# Patient Record
Sex: MALE | Race: WHITE | URBAN - METROPOLITAN AREA
[De-identification: names, ages, dates, MRNs, and addresses within clinical notes are randomized per-mention and may not be internally consistent; named-entity substitution may affect disease eponyms.]

---

## 2020-07-29 ENCOUNTER — OFFICE VISIT (OUTPATIENT)
Dept: URBAN - METROPOLITAN AREA CLINIC 82 | Facility: CLINIC | Age: 60
End: 2020-07-29
Payer: COMMERCIAL

## 2020-07-29 DIAGNOSIS — R14.0 BLOATING AND GAS: ICD-10-CM

## 2020-07-29 DIAGNOSIS — R19.7 DIARRHEA: ICD-10-CM

## 2020-07-29 PROCEDURE — G8417 CALC BMI ABV UP PARAM F/U: HCPCS | Performed by: INTERNAL MEDICINE

## 2020-07-29 PROCEDURE — G8427 DOCREV CUR MEDS BY ELIG CLIN: HCPCS | Performed by: INTERNAL MEDICINE

## 2020-07-29 PROCEDURE — G9903 PT SCRN TBCO ID AS NON USER: HCPCS | Performed by: INTERNAL MEDICINE

## 2020-07-29 PROCEDURE — 1036F TOBACCO NON-USER: CPT | Performed by: INTERNAL MEDICINE

## 2020-07-29 PROCEDURE — 99244 OFF/OP CNSLTJ NEW/EST MOD 40: CPT | Performed by: INTERNAL MEDICINE

## 2020-07-29 PROCEDURE — 99204 OFFICE O/P NEW MOD 45 MIN: CPT | Performed by: INTERNAL MEDICINE

## 2020-07-29 RX ORDER — CELECOXIB 200 MG/1
1 CAPSULE WITH FOOD CAPSULE ORAL ONCE A DAY
Status: ACTIVE | COMMUNITY

## 2020-07-29 RX ORDER — TAMSULOSIN HYDROCHLORIDE 0.4 MG/1
1 CAPSULE CAPSULE ORAL ONCE A DAY
Status: ACTIVE | COMMUNITY

## 2020-07-29 RX ORDER — SILDENAFIL 100 MG/1
1 TABLET AS NEEDED TABLET, FILM COATED ORAL ONCE A DAY
Status: ACTIVE | COMMUNITY

## 2020-07-29 RX ORDER — MULTIVITAMIN
AS DIRECTED CAPSULE ORAL
Status: ACTIVE | COMMUNITY

## 2020-07-29 RX ORDER — METFORMIN HYDROCHLORIDE 500 MG/1
1 TABLET WITH A MEAL TABLET, FILM COATED ORAL ONCE A DAY
Status: ACTIVE | COMMUNITY

## 2020-07-29 RX ORDER — MONTELUKAST SODIUM 10 MG/1
1 TABLET TABLET ORAL ONCE A DAY
Status: ACTIVE | COMMUNITY

## 2020-07-29 RX ORDER — ASCORBIC ACID 100 MG
1 TABLET TABLET,CHEWABLE ORAL ONCE A DAY
Status: ACTIVE | COMMUNITY

## 2020-07-29 RX ORDER — BISMUTH SUBSALICYLATE 525 MG/1
AS DIRECTED TABLET ORAL ONCE A DAY
Qty: 30 | Refills: 1 | OUTPATIENT
Start: 2020-07-29 | End: 2020-09-27

## 2020-07-29 RX ORDER — CHOLESTYRAMINE 4 G/9G
1 PACKET MIXED WITH WATER OR NON-CARBONATED DRINK POWDER, FOR SUSPENSION ORAL TWICE A DAY
Qty: 60 | Refills: 1 | OUTPATIENT
Start: 2020-07-29

## 2020-07-29 RX ORDER — POLYETHYLENE GLYCOL-3350 AND ELECTROLYTES WITH FLAVOR PACK 240; 5.84; 2.98; 6.72; 22.72 G/278.26G; G/278.26G; G/278.26G; G/278.26G; G/278.26G
AS DIRECTED POWDER, FOR SOLUTION ORAL
Qty: 1 | Refills: 0 | OUTPATIENT
Start: 2020-07-29 | End: 2020-07-30

## 2020-07-29 NOTE — HPI-TODAY'S VISIT:
07/29/2020 Patient is a 59 year old, White male, who presents with complaints of explosive, loose bowel movements. He has been on Metformin for about a year now. He had a colonoscopy in the past, many years ago. He reports abdominal pain, only when he is about to have a bowel movement. He denies any rectal bleeding. He does report constipation in the morning. Patient is also taking testosterone.

## 2020-08-28 ENCOUNTER — OFFICE VISIT (OUTPATIENT)
Dept: URBAN - METROPOLITAN AREA SURGERY CENTER 13 | Facility: SURGERY CENTER | Age: 60
End: 2020-08-28
Payer: COMMERCIAL

## 2020-08-28 ENCOUNTER — OFFICE VISIT (OUTPATIENT)
Dept: URBAN - METROPOLITAN AREA SURGERY CENTER 13 | Facility: SURGERY CENTER | Age: 60
End: 2020-08-28

## 2020-08-28 DIAGNOSIS — K64.1 GRADE II HEMORRHOIDS: ICD-10-CM

## 2020-08-28 DIAGNOSIS — K63.89 BACTERIAL OVERGROWTH SYNDROME: ICD-10-CM

## 2020-08-28 DIAGNOSIS — K52.9 ACUTE COLITIS: ICD-10-CM

## 2020-08-28 PROCEDURE — 46221 LIGATION OF HEMORRHOID(S): CPT | Performed by: INTERNAL MEDICINE

## 2020-08-28 PROCEDURE — 45380 COLONOSCOPY AND BIOPSY: CPT | Performed by: INTERNAL MEDICINE

## 2020-08-28 PROCEDURE — G8907 PT DOC NO EVENTS ON DISCHARG: HCPCS | Performed by: INTERNAL MEDICINE

## 2020-08-28 PROCEDURE — G9937 DIG OR SURV COLSCO: HCPCS | Performed by: INTERNAL MEDICINE

## 2022-09-28 ENCOUNTER — WEB ENCOUNTER (OUTPATIENT)
Dept: URBAN - METROPOLITAN AREA CLINIC 82 | Facility: CLINIC | Age: 62
End: 2022-09-28

## 2022-09-28 ENCOUNTER — OFFICE VISIT (OUTPATIENT)
Dept: URBAN - METROPOLITAN AREA CLINIC 82 | Facility: CLINIC | Age: 62
End: 2022-09-28
Payer: COMMERCIAL

## 2022-09-28 VITALS
RESPIRATION RATE: 16 BRPM | WEIGHT: 245 LBS | TEMPERATURE: 97.7 F | HEIGHT: 71 IN | DIASTOLIC BLOOD PRESSURE: 76 MMHG | SYSTOLIC BLOOD PRESSURE: 117 MMHG | BODY MASS INDEX: 34.3 KG/M2 | HEART RATE: 102 BPM

## 2022-09-28 DIAGNOSIS — R14.0 BLOATING AND GAS: ICD-10-CM

## 2022-09-28 DIAGNOSIS — R19.7 DIARRHEA: ICD-10-CM

## 2022-09-28 DIAGNOSIS — K58.0 IRRITABLE BOWEL SYNDROME WITH DIARRHEA: ICD-10-CM

## 2022-09-28 PROCEDURE — G8417 CALC BMI ABV UP PARAM F/U: HCPCS | Performed by: INTERNAL MEDICINE

## 2022-09-28 PROCEDURE — G8427 DOCREV CUR MEDS BY ELIG CLIN: HCPCS | Performed by: INTERNAL MEDICINE

## 2022-09-28 PROCEDURE — 99214 OFFICE O/P EST MOD 30 MIN: CPT | Performed by: INTERNAL MEDICINE

## 2022-09-28 PROCEDURE — G9903 PT SCRN TBCO ID AS NON USER: HCPCS | Performed by: INTERNAL MEDICINE

## 2022-09-28 RX ORDER — TAMSULOSIN HYDROCHLORIDE 0.4 MG/1
1 CAPSULE CAPSULE ORAL ONCE A DAY
Status: ACTIVE | COMMUNITY

## 2022-09-28 RX ORDER — METFORMIN HYDROCHLORIDE 500 MG/1
1 TABLET WITH A MEAL TABLET, FILM COATED ORAL ONCE A DAY
Status: ACTIVE | COMMUNITY

## 2022-09-28 RX ORDER — CHOLESTYRAMINE 4 G/9G
1 PACKET MIXED WITH WATER OR NON-CARBONATED DRINK POWDER, FOR SUSPENSION ORAL TWICE A DAY
Qty: 60 | Refills: 1 | OUTPATIENT
Start: 2022-09-28

## 2022-09-28 RX ORDER — ASCORBIC ACID 100 MG
1 TABLET TABLET,CHEWABLE ORAL ONCE A DAY
Status: ACTIVE | COMMUNITY

## 2022-09-28 RX ORDER — CELECOXIB 200 MG/1
1 CAPSULE WITH FOOD CAPSULE ORAL ONCE A DAY
Status: ACTIVE | COMMUNITY

## 2022-09-28 RX ORDER — MULTIVITAMIN
AS DIRECTED CAPSULE ORAL
Status: ACTIVE | COMMUNITY

## 2022-09-28 RX ORDER — HYOSCYAMINE SULFATE 0.12 MG/1
1 TABLET AS NEEDED TABLET ORAL THREE TIMES A DAY
Qty: 270 TABLET | Refills: 1 | OUTPATIENT
Start: 2022-09-28 | End: 2023-03-27

## 2022-09-28 RX ORDER — MONTELUKAST SODIUM 10 MG/1
1 TABLET TABLET ORAL ONCE A DAY
Status: ACTIVE | COMMUNITY

## 2022-09-28 RX ORDER — CHOLESTYRAMINE 4 G/9G
1 PACKET MIXED WITH WATER OR NON-CARBONATED DRINK POWDER, FOR SUSPENSION ORAL TWICE A DAY
Qty: 60 | Refills: 1 | Status: ACTIVE | COMMUNITY
Start: 2020-07-29

## 2022-09-28 RX ORDER — SILDENAFIL 100 MG/1
1 TABLET AS NEEDED TABLET, FILM COATED ORAL ONCE A DAY
Status: ACTIVE | COMMUNITY

## 2022-09-28 NOTE — HPI-TODAY'S VISIT:
07/29/2020 Patient is a 59 year old, White male, who presents with complaints of explosive, loose bowel movements. He has been on Metformin for about a year now. He had a colonoscopy in the past, many years ago. He reports abdominal pain, only when he is about to have a bowel movement. He denies any rectal bleeding. He does report constipation in the morning. Patient is also taking testosterone.  9/28/2022 Patient presents for GI issues. Patient states that he has constant diarrhea. Patient states that the diarrhea has been the same since the last visit. He states that he has about 3-4 bowels movements a day with diarrhea. Patient states that he believes that he was given questran in 2020 but that he does not remember if it was helpful. Patient takes metformin for diabetes. He takes testosterone as well. Patient states that he thinks that anything causes him the diarrhea. He denies being more stressed lately and getting upset easily. Patient denies family history of colon caner or polyps. He states that he takes ibuprofen occasionally. Patient still has his gall bladder.

## 2022-09-30 ENCOUNTER — TELEPHONE ENCOUNTER (OUTPATIENT)
Dept: URBAN - METROPOLITAN AREA CLINIC 82 | Facility: CLINIC | Age: 62
End: 2022-09-30

## 2022-10-24 ENCOUNTER — WEB ENCOUNTER (OUTPATIENT)
Dept: URBAN - METROPOLITAN AREA CLINIC 82 | Facility: CLINIC | Age: 62
End: 2022-10-24

## 2022-10-26 ENCOUNTER — OFFICE VISIT (OUTPATIENT)
Dept: URBAN - METROPOLITAN AREA CLINIC 82 | Facility: CLINIC | Age: 62
End: 2022-10-26
Payer: COMMERCIAL

## 2022-10-26 VITALS
HEIGHT: 71 IN | DIASTOLIC BLOOD PRESSURE: 78 MMHG | HEART RATE: 83 BPM | TEMPERATURE: 97.7 F | WEIGHT: 236.2 LBS | SYSTOLIC BLOOD PRESSURE: 132 MMHG | BODY MASS INDEX: 33.07 KG/M2 | RESPIRATION RATE: 16 BRPM

## 2022-10-26 DIAGNOSIS — R19.7 DIARRHEA: ICD-10-CM

## 2022-10-26 DIAGNOSIS — R14.0 BLOATING AND GAS: ICD-10-CM

## 2022-10-26 DIAGNOSIS — K58.0 IRRITABLE BOWEL SYNDROME WITH DIARRHEA: ICD-10-CM

## 2022-10-26 PROCEDURE — G8417 CALC BMI ABV UP PARAM F/U: HCPCS | Performed by: INTERNAL MEDICINE

## 2022-10-26 PROCEDURE — G9903 PT SCRN TBCO ID AS NON USER: HCPCS | Performed by: INTERNAL MEDICINE

## 2022-10-26 PROCEDURE — 99214 OFFICE O/P EST MOD 30 MIN: CPT | Performed by: INTERNAL MEDICINE

## 2022-10-26 PROCEDURE — G8427 DOCREV CUR MEDS BY ELIG CLIN: HCPCS | Performed by: INTERNAL MEDICINE

## 2022-10-26 RX ORDER — METFORMIN HYDROCHLORIDE 500 MG/1
1 TABLET WITH A MEAL TABLET, FILM COATED ORAL ONCE A DAY
Status: ACTIVE | COMMUNITY

## 2022-10-26 RX ORDER — MULTIVITAMIN
AS DIRECTED CAPSULE ORAL
Status: ACTIVE | COMMUNITY

## 2022-10-26 RX ORDER — HYOSCYAMINE SULFATE 0.12 MG/1
1 TABLET AS NEEDED TABLET ORAL THREE TIMES A DAY
Qty: 270 TABLET | Refills: 1 | Status: ACTIVE | COMMUNITY
Start: 2022-09-28 | End: 2023-03-27

## 2022-10-26 RX ORDER — CELECOXIB 200 MG/1
1 CAPSULE WITH FOOD CAPSULE ORAL ONCE A DAY
Status: ACTIVE | COMMUNITY

## 2022-10-26 RX ORDER — TAMSULOSIN HYDROCHLORIDE 0.4 MG/1
1 CAPSULE CAPSULE ORAL ONCE A DAY
Status: ACTIVE | COMMUNITY

## 2022-10-26 RX ORDER — ASCORBIC ACID 100 MG
1 TABLET TABLET,CHEWABLE ORAL ONCE A DAY
Status: ACTIVE | COMMUNITY

## 2022-10-26 RX ORDER — CHOLESTYRAMINE 4 G/9G
1 PACKET MIXED WITH WATER OR NON-CARBONATED DRINK POWDER, FOR SUSPENSION ORAL TWICE A DAY
Qty: 60 | Refills: 1 | Status: ACTIVE | COMMUNITY
Start: 2020-07-29

## 2022-10-26 RX ORDER — MONTELUKAST SODIUM 10 MG/1
1 TABLET TABLET ORAL ONCE A DAY
Status: ACTIVE | COMMUNITY

## 2022-10-26 RX ORDER — CHOLESTYRAMINE 4 G/9G
1 PACKET MIXED WITH WATER OR NON-CARBONATED DRINK POWDER, FOR SUSPENSION ORAL TWICE A DAY
Qty: 60 | Refills: 1 | Status: ACTIVE | COMMUNITY
Start: 2022-09-28

## 2022-10-26 RX ORDER — SILDENAFIL 100 MG/1
1 TABLET AS NEEDED TABLET, FILM COATED ORAL ONCE A DAY
Status: ACTIVE | COMMUNITY

## 2022-10-26 NOTE — HPI-TODAY'S VISIT:
07/29/2020 Patient is a 59 year old, White male, who presents with complaints of explosive, loose bowel movements. He has been on Metformin for about a year now. He had a colonoscopy in the past, many years ago. He reports abdominal pain, only when he is about to have a bowel movement. He denies any rectal bleeding. He does report constipation in the morning. Patient is also taking testosterone.  9/28/2022 Patient presents for GI issues. Patient states that he has constant diarrhea. Patient states that the diarrhea has been the same since the last visit. He states that he has about 3-4 bowels movements a day with diarrhea. Patient states that he believes that he was given questran in 2020 but that he does not remember if it was helpful. Patient takes metformin for diabetes. He takes testosterone as well. Patient states that he thinks that anything causes him the diarrhea. He denies being more stressed lately and getting upset easily. Patient denies family history of colon caner or polyps. He states that he takes ibuprofen occasionally. Patient still has his gall bladder.  10/26/2022 Patient presents for a follow up on medications. Patient denies any more loose bowels. He states that he is still on hyoscyamine and questran powder for his diarrhea. He does state that he is getting a bit constipated now and wants to cut back on the questran powder. Patient also states that he has gas. He states that overall, things have been a lot better since the last visit. Patient states that he stopped the metformin.

## 2023-01-18 ENCOUNTER — OFFICE VISIT (OUTPATIENT)
Dept: URBAN - METROPOLITAN AREA CLINIC 82 | Facility: CLINIC | Age: 63
End: 2023-01-18
Payer: COMMERCIAL

## 2023-01-18 VITALS
WEIGHT: 236 LBS | HEIGHT: 71 IN | HEART RATE: 75 BPM | SYSTOLIC BLOOD PRESSURE: 135 MMHG | TEMPERATURE: 98.4 F | DIASTOLIC BLOOD PRESSURE: 81 MMHG | BODY MASS INDEX: 33.04 KG/M2

## 2023-01-18 DIAGNOSIS — R14.0 BLOATING AND GAS: ICD-10-CM

## 2023-01-18 DIAGNOSIS — K58.0 IRRITABLE BOWEL SYNDROME WITH DIARRHEA: ICD-10-CM

## 2023-01-18 DIAGNOSIS — R19.7 DIARRHEA: ICD-10-CM

## 2023-01-18 PROBLEM — 271832001 FLATULENCE, ERUCTATION AND GAS PAIN: Status: ACTIVE | Noted: 2020-07-29

## 2023-01-18 PROCEDURE — G8417 CALC BMI ABV UP PARAM F/U: HCPCS | Performed by: INTERNAL MEDICINE

## 2023-01-18 PROCEDURE — 99214 OFFICE O/P EST MOD 30 MIN: CPT | Performed by: INTERNAL MEDICINE

## 2023-01-18 PROCEDURE — G8427 DOCREV CUR MEDS BY ELIG CLIN: HCPCS | Performed by: INTERNAL MEDICINE

## 2023-01-18 PROCEDURE — G9903 PT SCRN TBCO ID AS NON USER: HCPCS | Performed by: INTERNAL MEDICINE

## 2023-01-18 RX ORDER — CHOLESTYRAMINE 4 G/9G
1 PACKET MIXED WITH WATER OR NON-CARBONATED DRINK POWDER, FOR SUSPENSION ORAL TWICE A DAY
Qty: 60 | Refills: 1 | Status: ACTIVE | COMMUNITY
Start: 2020-07-29

## 2023-01-18 RX ORDER — TAMSULOSIN HYDROCHLORIDE 0.4 MG/1
1 CAPSULE CAPSULE ORAL ONCE A DAY
Status: ACTIVE | COMMUNITY

## 2023-01-18 RX ORDER — ASCORBIC ACID 100 MG
1 TABLET TABLET,CHEWABLE ORAL ONCE A DAY
Status: ACTIVE | COMMUNITY

## 2023-01-18 RX ORDER — HYOSCYAMINE SULFATE 0.12 MG/1
1 TABLET AS NEEDED TABLET ORAL THREE TIMES A DAY
Qty: 270 TABLET | Refills: 1 | OUTPATIENT
Start: 2023-01-18 | End: 2023-07-17

## 2023-01-18 RX ORDER — CHOLESTYRAMINE 4 G/9G
1 PACKET MIXED WITH WATER OR NON-CARBONATED DRINK POWDER, FOR SUSPENSION ORAL ONCE A DAY
Qty: 90 | Refills: 1 | OUTPATIENT
Start: 2023-01-18

## 2023-01-18 RX ORDER — MONTELUKAST SODIUM 10 MG/1
1 TABLET TABLET ORAL ONCE A DAY
Status: ACTIVE | COMMUNITY

## 2023-01-18 RX ORDER — SILDENAFIL 100 MG/1
1 TABLET AS NEEDED TABLET, FILM COATED ORAL ONCE A DAY
Status: ACTIVE | COMMUNITY

## 2023-01-18 RX ORDER — MULTIVITAMIN
AS DIRECTED CAPSULE ORAL
Status: ACTIVE | COMMUNITY

## 2023-01-18 RX ORDER — CHOLESTYRAMINE 4 G/9G
1 PACKET MIXED WITH WATER OR NON-CARBONATED DRINK POWDER, FOR SUSPENSION ORAL TWICE A DAY
Qty: 60 | Refills: 1 | Status: ACTIVE | COMMUNITY
Start: 2022-09-28

## 2023-01-18 RX ORDER — HYOSCYAMINE SULFATE 0.12 MG/1
1 TABLET AS NEEDED TABLET ORAL THREE TIMES A DAY
Qty: 270 TABLET | Refills: 1 | Status: ACTIVE | COMMUNITY
Start: 2022-09-28 | End: 2023-03-27

## 2023-01-18 RX ORDER — CELECOXIB 200 MG/1
1 CAPSULE WITH FOOD CAPSULE ORAL ONCE A DAY
Status: ACTIVE | COMMUNITY

## 2023-01-18 RX ORDER — METFORMIN HYDROCHLORIDE 500 MG/1
1 TABLET WITH A MEAL TABLET, FILM COATED ORAL ONCE A DAY
Status: ACTIVE | COMMUNITY

## 2023-01-18 NOTE — HPI-TODAY'S VISIT:
07/29/2020 Patient is a 59 year old, White male, who presents with complaints of explosive, loose bowel movements. He has been on Metformin for about a year now. He had a colonoscopy in the past, many years ago. He reports abdominal pain, only when he is about to have a bowel movement. He denies any rectal bleeding. He does report constipation in the morning. Patient is also taking testosterone.  9/28/2022 Patient presents for GI issues. Patient states that he has constant diarrhea. Patient states that the diarrhea has been the same since the last visit. He states that he has about 3-4 bowels movements a day with diarrhea. Patient states that he believes that he was given questran in 2020 but that he does not remember if it was helpful. Patient takes metformin for diabetes. He takes testosterone as well. Patient states that he thinks that anything causes him the diarrhea. He denies being more stressed lately and getting upset easily. Patient denies family history of colon caner or polyps. He states that he takes ibuprofen occasionally. Patient still has his gall bladder.  10/26/2022 Patient presents for a follow up on medications. Patient denies any more loose bowels. He states that he is still on hyoscyamine and questran powder for his diarrhea. He does state that he is getting a bit constipated now and wants to cut back on the questran powder. Patient also states that he has gas. He states that overall, things have been a lot better since the last visit. Patient states that he stopped the metformin.  1/18/2023 Patient presents for a follow up. Patient states that he is having has and that he will have loose bowels if he does not take the questran powder. He is currently taking one pack of powder a day. He states that he is having some constipation. Patient states that he has bloating sometimes and states that he seems to get gas with any foods. Patient states that he is on IBgard and that he has not seen much difference. Patient states that overall he is feeling a lot better. He states that he is on hyoscyamine twice a day. He denies heartburn and acid reflux.

## 2023-04-19 ENCOUNTER — OFFICE VISIT (OUTPATIENT)
Dept: URBAN - METROPOLITAN AREA CLINIC 82 | Facility: CLINIC | Age: 63
End: 2023-04-19

## 2023-06-02 ENCOUNTER — OFFICE VISIT (OUTPATIENT)
Dept: URBAN - METROPOLITAN AREA CLINIC 82 | Facility: CLINIC | Age: 63
End: 2023-06-02

## 2023-06-02 ENCOUNTER — TELEPHONE ENCOUNTER (OUTPATIENT)
Dept: URBAN - METROPOLITAN AREA CLINIC 82 | Facility: CLINIC | Age: 63
End: 2023-06-02

## 2023-06-02 RX ORDER — CHOLESTYRAMINE 4 G/9G
1 PACKET MIXED WITH WATER OR NON-CARBONATED DRINK POWDER, FOR SUSPENSION ORAL TWICE A DAY
Qty: 60 | Refills: 1 | Status: ACTIVE | COMMUNITY
Start: 2020-07-29

## 2023-06-02 RX ORDER — HYOSCYAMINE SULFATE 0.12 MG/1
1 TABLET AS NEEDED TABLET ORAL THREE TIMES A DAY
Qty: 270 TABLET | Refills: 1 | Status: ACTIVE | COMMUNITY
Start: 2023-01-18 | End: 2023-07-17

## 2023-06-02 RX ORDER — CELECOXIB 200 MG/1
1 CAPSULE WITH FOOD CAPSULE ORAL ONCE A DAY
Status: ACTIVE | COMMUNITY

## 2023-06-02 RX ORDER — SILDENAFIL 100 MG/1
1 TABLET AS NEEDED TABLET, FILM COATED ORAL ONCE A DAY
Status: ACTIVE | COMMUNITY

## 2023-06-02 RX ORDER — MONTELUKAST SODIUM 10 MG/1
1 TABLET TABLET ORAL ONCE A DAY
Status: ACTIVE | COMMUNITY

## 2023-06-02 RX ORDER — CHOLESTYRAMINE 4 G/9G
1 PACKET MIXED WITH WATER OR NON-CARBONATED DRINK POWDER, FOR SUSPENSION ORAL TWICE A DAY
Qty: 180 | Refills: 1 | OUTPATIENT
Start: 2023-06-02

## 2023-06-02 RX ORDER — ASCORBIC ACID 100 MG
1 TABLET TABLET,CHEWABLE ORAL ONCE A DAY
Status: ACTIVE | COMMUNITY

## 2023-06-02 RX ORDER — MULTIVITAMIN
AS DIRECTED CAPSULE ORAL
Status: ACTIVE | COMMUNITY

## 2023-06-02 RX ORDER — CHOLESTYRAMINE 4 G/9G
1 PACKET MIXED WITH WATER OR NON-CARBONATED DRINK POWDER, FOR SUSPENSION ORAL TWICE A DAY
Qty: 60 | Refills: 1 | Status: ACTIVE | COMMUNITY
Start: 2022-09-28

## 2023-06-02 RX ORDER — METFORMIN HYDROCHLORIDE 500 MG/1
1 TABLET WITH A MEAL TABLET, FILM COATED ORAL ONCE A DAY
Status: ACTIVE | COMMUNITY

## 2023-06-02 RX ORDER — TAMSULOSIN HYDROCHLORIDE 0.4 MG/1
1 CAPSULE CAPSULE ORAL ONCE A DAY
Status: ACTIVE | COMMUNITY

## 2023-06-02 RX ORDER — HYOSCYAMINE SULFATE 0.12 MG/1
1 TABLET AS NEEDED TABLET ORAL THREE TIMES A DAY
Qty: 270 TABLET | Refills: 1 | OUTPATIENT
Start: 2023-06-02 | End: 2023-11-28

## 2023-06-02 RX ORDER — CHOLESTYRAMINE 4 G/9G
1 PACKET MIXED WITH WATER OR NON-CARBONATED DRINK POWDER, FOR SUSPENSION ORAL ONCE A DAY
Qty: 90 | Refills: 1 | Status: ACTIVE | COMMUNITY
Start: 2023-01-18

## 2023-06-14 ENCOUNTER — TELEPHONE ENCOUNTER (OUTPATIENT)
Dept: URBAN - NONMETROPOLITAN AREA CLINIC 2 | Facility: CLINIC | Age: 63
End: 2023-06-14

## 2023-06-14 RX ORDER — HYOSCYAMINE SULFATE 0.12 MG/1
1 TABLET AS NEEDED TABLET ORAL THREE TIMES A DAY
Qty: 270 TABLET | Refills: 1
Start: 2023-06-02 | End: 2023-12-11

## 2023-06-23 ENCOUNTER — TELEPHONE ENCOUNTER (OUTPATIENT)
Dept: URBAN - METROPOLITAN AREA CLINIC 82 | Facility: CLINIC | Age: 63
End: 2023-06-23

## 2023-06-23 RX ORDER — CHOLESTYRAMINE 4 G/9G
1 PACKET MIXED WITH WATER OR NON-CARBONATED DRINK POWDER, FOR SUSPENSION ORAL TWICE A DAY
Qty: 60 | Refills: 1
Start: 2020-07-29

## 2023-07-12 ENCOUNTER — LAB OUTSIDE AN ENCOUNTER (OUTPATIENT)
Dept: URBAN - METROPOLITAN AREA CLINIC 82 | Facility: CLINIC | Age: 63
End: 2023-07-12

## 2023-07-12 ENCOUNTER — OFFICE VISIT (OUTPATIENT)
Dept: URBAN - METROPOLITAN AREA CLINIC 82 | Facility: CLINIC | Age: 63
End: 2023-07-12
Payer: COMMERCIAL

## 2023-07-12 VITALS
WEIGHT: 250 LBS | TEMPERATURE: 98.4 F | HEIGHT: 71 IN | HEART RATE: 96 BPM | BODY MASS INDEX: 35 KG/M2 | SYSTOLIC BLOOD PRESSURE: 125 MMHG | DIASTOLIC BLOOD PRESSURE: 80 MMHG

## 2023-07-12 DIAGNOSIS — R14.0 BLOATING: ICD-10-CM

## 2023-07-12 DIAGNOSIS — K58.0 IRRITABLE BOWEL SYNDROME WITH DIARRHEA: ICD-10-CM

## 2023-07-12 DIAGNOSIS — R19.7 DIARRHEA: ICD-10-CM

## 2023-07-12 PROCEDURE — 99214 OFFICE O/P EST MOD 30 MIN: CPT | Performed by: INTERNAL MEDICINE

## 2023-07-12 RX ORDER — CELECOXIB 200 MG/1
1 CAPSULE WITH FOOD CAPSULE ORAL ONCE A DAY
Status: ACTIVE | COMMUNITY

## 2023-07-12 RX ORDER — POLYETHYLENE GLYCOL-3350 AND ELECTROLYTES WITH FLAVOR PACK 240; 5.84; 2.98; 6.72; 22.72 G/278.26G; G/278.26G; G/278.26G; G/278.26G; G/278.26G
AS DIRECTED POWDER, FOR SOLUTION ORAL
Qty: 1 | Refills: 0 | OUTPATIENT
Start: 2023-07-12 | End: 2023-07-13

## 2023-07-12 RX ORDER — MONTELUKAST SODIUM 10 MG/1
1 TABLET TABLET ORAL ONCE A DAY
Status: ACTIVE | COMMUNITY

## 2023-07-12 RX ORDER — HYOSCYAMINE SULFATE 0.12 MG/1
1 TABLET AS NEEDED TABLET ORAL THREE TIMES A DAY
Qty: 270 TABLET | Refills: 1 | Status: ACTIVE | COMMUNITY
Start: 2023-06-02 | End: 2023-12-11

## 2023-07-12 RX ORDER — CHOLESTYRAMINE 4 G/9G
1 PACKET MIXED WITH WATER OR NON-CARBONATED DRINK POWDER, FOR SUSPENSION ORAL ONCE A DAY
Qty: 90 | Refills: 1 | Status: ACTIVE | COMMUNITY
Start: 2023-01-18

## 2023-07-12 RX ORDER — TAMSULOSIN HYDROCHLORIDE 0.4 MG/1
1 CAPSULE CAPSULE ORAL ONCE A DAY
Status: ACTIVE | COMMUNITY

## 2023-07-12 RX ORDER — CHOLESTYRAMINE 4 G/9G
1 PACKET MIXED WITH WATER OR NON-CARBONATED DRINK POWDER, FOR SUSPENSION ORAL TWICE A DAY
Qty: 60 | Refills: 1 | Status: ACTIVE | COMMUNITY
Start: 2020-07-29

## 2023-07-12 RX ORDER — METFORMIN HYDROCHLORIDE 500 MG/1
1 TABLET WITH A MEAL TABLET, FILM COATED ORAL ONCE A DAY
Status: ACTIVE | COMMUNITY

## 2023-07-12 RX ORDER — SILDENAFIL 100 MG/1
1 TABLET AS NEEDED TABLET, FILM COATED ORAL ONCE A DAY
Status: ACTIVE | COMMUNITY

## 2023-07-12 RX ORDER — HYOSCYAMINE SULFATE 0.12 MG/1
1 TABLET AS NEEDED TABLET ORAL THREE TIMES A DAY
Qty: 270 TABLET | Refills: 1 | Status: ACTIVE | COMMUNITY
Start: 2023-01-18 | End: 2023-07-17

## 2023-07-12 RX ORDER — ASCORBIC ACID 100 MG
1 TABLET TABLET,CHEWABLE ORAL ONCE A DAY
Status: ACTIVE | COMMUNITY

## 2023-07-12 RX ORDER — CHOLESTYRAMINE 4 G/9G
1 PACKET MIXED WITH WATER OR NON-CARBONATED DRINK POWDER, FOR SUSPENSION ORAL TWICE A DAY
Qty: 60 | Refills: 1 | Status: ACTIVE | COMMUNITY
Start: 2022-09-28

## 2023-07-12 RX ORDER — MULTIVITAMIN
AS DIRECTED CAPSULE ORAL
Status: ACTIVE | COMMUNITY

## 2023-07-12 RX ORDER — CHOLESTYRAMINE 4 G/9G
1 PACKET MIXED WITH WATER OR NON-CARBONATED DRINK POWDER, FOR SUSPENSION ORAL TWICE A DAY
Qty: 180 | Refills: 1 | Status: ACTIVE | COMMUNITY
Start: 2023-06-02

## 2023-07-12 RX ORDER — CHOLESTYRAMINE 4 G/9G
1 PACKET MIXED WITH WATER OR NON-CARBONATED DRINK POWDER, FOR SUSPENSION ORAL TWICE A DAY
Qty: 180 | Refills: 1 | OUTPATIENT
Start: 2023-07-12

## 2023-07-12 NOTE — HPI-TODAY'S VISIT:
07/29/2020 Patient is a 59 year old, White male, who presents with complaints of explosive, loose bowel movements. He has been on Metformin for about a year now. He had a colonoscopy in the past, many years ago. He reports abdominal pain, only when he is about to have a bowel movement. He denies any rectal bleeding. He does report constipation in the morning. Patient is also taking testosterone.  9/28/2022 Patient presents for GI issues. Patient states that he has constant diarrhea. Patient states that the diarrhea has been the same since the last visit. He states that he has about 3-4 bowels movements a day with diarrhea. Patient states that he believes that he was given questran in 2020 but that he does not remember if it was helpful. Patient takes metformin for diabetes. He takes testosterone as well. Patient states that he thinks that anything causes him the diarrhea. He denies being more stressed lately and getting upset easily. Patient denies family history of colon caner or polyps. He states that he takes ibuprofen occasionally. Patient still has his gall bladder.  10/26/2022 Patient presents for a follow up on medications. Patient denies any more loose bowels. He states that he is still on hyoscyamine and questran powder for his diarrhea. He does state that he is getting a bit constipated now and wants to cut back on the questran powder. Patient also states that he has gas. He states that overall, things have been a lot better since the last visit. Patient states that he stopped the metformin.  1/18/2023 Patient presents for a follow up. Patient states that he is having has and that he will have loose bowels if he does not take the questran powder. He is currently taking one pack of powder a day. He states that he is having some constipation. Patient states that he has bloating sometimes and states that he seems to get gas with any foods. Patient states that he is on IBgard and that he has not seen much difference. Patient states that overall he is feeling a lot better. He states that he is on hyoscyamine twice a day. He denies heartburn and acid reflux.  7/12/2023 Patient is here for a follow up visit. Patient states that he continues to experience diarrhea. He states that his wife has come back home which has improved his diet quality. Patient states that his diet quality may decrease once she leaves soon. Patient states that he is currently taking IB Guard. Patient has also been taking cholestyramine and hyoscyamine.

## 2023-09-13 ENCOUNTER — OFFICE VISIT (OUTPATIENT)
Dept: URBAN - METROPOLITAN AREA CLINIC 82 | Facility: CLINIC | Age: 63
End: 2023-09-13
Payer: COMMERCIAL

## 2023-09-13 VITALS
BODY MASS INDEX: 35.42 KG/M2 | HEART RATE: 90 BPM | HEIGHT: 71 IN | WEIGHT: 253 LBS | TEMPERATURE: 98.1 F | SYSTOLIC BLOOD PRESSURE: 135 MMHG | DIASTOLIC BLOOD PRESSURE: 80 MMHG

## 2023-09-13 DIAGNOSIS — K58.0 IRRITABLE BOWEL SYNDROME WITH DIARRHEA: ICD-10-CM

## 2023-09-13 DIAGNOSIS — R19.7 DIARRHEA: ICD-10-CM

## 2023-09-13 DIAGNOSIS — R14.0 BLOATING: ICD-10-CM

## 2023-09-13 PROCEDURE — 99214 OFFICE O/P EST MOD 30 MIN: CPT | Performed by: INTERNAL MEDICINE

## 2023-09-13 RX ORDER — METFORMIN HYDROCHLORIDE 500 MG/1
1 TABLET WITH A MEAL TABLET, FILM COATED ORAL ONCE A DAY
Status: ACTIVE | COMMUNITY

## 2023-09-13 RX ORDER — CHOLESTYRAMINE 4 G/9G
1 PACKET MIXED WITH WATER OR NON-CARBONATED DRINK POWDER, FOR SUSPENSION ORAL TWICE A DAY
Qty: 180 | Refills: 1 | OUTPATIENT
Start: 2023-09-13

## 2023-09-13 RX ORDER — HYOSCYAMINE SULFATE 0.12 MG/1
1 TABLET AS NEEDED TABLET ORAL THREE TIMES A DAY
Qty: 270 TABLET | Refills: 1 | Status: ACTIVE | COMMUNITY
Start: 2023-06-02 | End: 2023-12-11

## 2023-09-13 RX ORDER — DICYCLOMINE HYDROCHLORIDE 10 MG/1
1 TABLET CAPSULE ORAL THREE TIMES A DAY
Qty: 270 TABLET | Refills: 1 | OUTPATIENT
Start: 2023-09-13 | End: 2024-03-11

## 2023-09-13 RX ORDER — MULTIVITAMIN
AS DIRECTED CAPSULE ORAL
Status: ACTIVE | COMMUNITY

## 2023-09-13 RX ORDER — CELECOXIB 200 MG/1
1 CAPSULE WITH FOOD CAPSULE ORAL ONCE A DAY
Status: ACTIVE | COMMUNITY

## 2023-09-13 RX ORDER — ASCORBIC ACID 100 MG
1 TABLET TABLET,CHEWABLE ORAL ONCE A DAY
Status: ACTIVE | COMMUNITY

## 2023-09-13 RX ORDER — TAMSULOSIN HYDROCHLORIDE 0.4 MG/1
1 CAPSULE CAPSULE ORAL ONCE A DAY
Status: ACTIVE | COMMUNITY

## 2023-09-13 RX ORDER — CHOLESTYRAMINE 4 G/9G
1 PACKET MIXED WITH WATER OR NON-CARBONATED DRINK POWDER, FOR SUSPENSION ORAL TWICE A DAY
Qty: 180 | Refills: 1 | Status: ACTIVE | COMMUNITY
Start: 2023-06-02

## 2023-09-13 RX ORDER — MONTELUKAST SODIUM 10 MG/1
1 TABLET TABLET ORAL ONCE A DAY
Status: ACTIVE | COMMUNITY

## 2023-09-13 RX ORDER — SILDENAFIL 100 MG/1
1 TABLET AS NEEDED TABLET, FILM COATED ORAL ONCE A DAY
Status: ACTIVE | COMMUNITY

## 2023-09-13 NOTE — HPI-TODAY'S VISIT:
07/29/2020 Patient is a 59 year old, White male, who presents with complaints of explosive, loose bowel movements. He has been on Metformin for about a year now. He had a colonoscopy in the past, many years ago. He reports abdominal pain, only when he is about to have a bowel movement. He denies any rectal bleeding. He does report constipation in the morning. Patient is also taking testosterone.  9/28/2022 Patient presents for GI issues. Patient states that he has constant diarrhea. Patient states that the diarrhea has been the same since the last visit. He states that he has about 3-4 bowels movements a day with diarrhea. Patient states that he believes that he was given questran in 2020 but that he does not remember if it was helpful. Patient takes metformin for diabetes. He takes testosterone as well. Patient states that he thinks that anything causes him the diarrhea. He denies being more stressed lately and getting upset easily. Patient denies family history of colon caner or polyps. He states that he takes ibuprofen occasionally. Patient still has his gall bladder.  10/26/2022 Patient presents for a follow up on medications. Patient denies any more loose bowels. He states that he is still on hyoscyamine and questran powder for his diarrhea. He does state that he is getting a bit constipated now and wants to cut back on the questran powder. Patient also states that he has gas. He states that overall, things have been a lot better since the last visit. Patient states that he stopped the metformin.  1/18/2023 Patient presents for a follow up. Patient states that he is having has and that he will have loose bowels if he does not take the questran powder. He is currently taking one pack of powder a day. He states that he is having some constipation. Patient states that he has bloating sometimes and states that he seems to get gas with any foods. Patient states that he is on IBgard and that he has not seen much difference. Patient states that overall he is feeling a lot better. He states that he is on hyoscyamine twice a day. He denies heartburn and acid reflux.  7/12/2023 Patient is here for a follow up visit. Patient states that he continues to experience diarrhea. He states that his wife has come back home which has improved his diet quality. Patient states that his diet quality may decrease once she leaves soon. Patient states that he is currently taking IBgard. Patient has also been taking cholestyramine and hyoscyamine.  9/13/2023 Patient presents for a follow up. Patient denies having a lot of loose bowels and states that he only has a couple of loose bowels. Patient states that he has noticed certain foods that trigger his symptoms. He is currently on dicyclomine and questran powder. Patient is also still on IBgard.

## 2024-01-10 ENCOUNTER — OFFICE VISIT (OUTPATIENT)
Dept: URBAN - METROPOLITAN AREA CLINIC 82 | Facility: CLINIC | Age: 64
End: 2024-01-10
Payer: COMMERCIAL

## 2024-01-10 VITALS
HEIGHT: 71 IN | SYSTOLIC BLOOD PRESSURE: 131 MMHG | WEIGHT: 251 LBS | HEART RATE: 71 BPM | BODY MASS INDEX: 35.14 KG/M2 | DIASTOLIC BLOOD PRESSURE: 83 MMHG | TEMPERATURE: 98.4 F

## 2024-01-10 DIAGNOSIS — K58.0 IRRITABLE BOWEL SYNDROME WITH DIARRHEA: ICD-10-CM

## 2024-01-10 DIAGNOSIS — R19.7 DIARRHEA: ICD-10-CM

## 2024-01-10 DIAGNOSIS — R14.0 BLOATING: ICD-10-CM

## 2024-01-10 PROBLEM — 62315008 DIARRHEA: Status: ACTIVE | Noted: 2020-07-29

## 2024-01-10 PROBLEM — 197125005: Status: ACTIVE | Noted: 2022-09-28

## 2024-01-10 PROCEDURE — 99214 OFFICE O/P EST MOD 30 MIN: CPT | Performed by: INTERNAL MEDICINE

## 2024-01-10 RX ORDER — MONTELUKAST SODIUM 10 MG/1
1 TABLET TABLET ORAL ONCE A DAY
Status: ACTIVE | COMMUNITY

## 2024-01-10 RX ORDER — DICYCLOMINE HYDROCHLORIDE 10 MG/1
1 TABLET CAPSULE ORAL THREE TIMES A DAY
Qty: 270 TABLET | Refills: 1 | Status: ACTIVE | COMMUNITY
Start: 2023-09-13 | End: 2024-03-11

## 2024-01-10 RX ORDER — DICYCLOMINE HYDROCHLORIDE 10 MG/1
1 TABLET CAPSULE ORAL THREE TIMES A DAY
Qty: 270 TABLET | Refills: 1 | OUTPATIENT
Start: 2024-01-10 | End: 2024-07-08

## 2024-01-10 RX ORDER — CHOLESTYRAMINE 4 G/9G
1 PACKET MIXED WITH WATER OR NON-CARBONATED DRINK POWDER, FOR SUSPENSION ORAL TWICE A DAY
Qty: 180 | Refills: 1 | Status: ACTIVE | COMMUNITY
Start: 2023-06-02

## 2024-01-10 RX ORDER — CHOLESTYRAMINE 4 G/9G
1 PACKET MIXED WITH WATER OR NON-CARBONATED DRINK POWDER, FOR SUSPENSION ORAL TWICE A DAY
Qty: 180 | Refills: 1 | OUTPATIENT
Start: 2024-01-10

## 2024-01-10 RX ORDER — CHOLESTYRAMINE 4 G/9G
1 PACKET MIXED WITH WATER OR NON-CARBONATED DRINK POWDER, FOR SUSPENSION ORAL TWICE A DAY
Qty: 180 | Refills: 1 | Status: ACTIVE | COMMUNITY
Start: 2023-09-13

## 2024-01-10 RX ORDER — TAMSULOSIN HYDROCHLORIDE 0.4 MG/1
1 CAPSULE CAPSULE ORAL ONCE A DAY
Status: ACTIVE | COMMUNITY

## 2024-01-10 RX ORDER — SILDENAFIL 100 MG/1
1 TABLET AS NEEDED TABLET, FILM COATED ORAL ONCE A DAY
Status: ACTIVE | COMMUNITY

## 2024-01-10 RX ORDER — MULTIVITAMIN
AS DIRECTED CAPSULE ORAL
Status: ACTIVE | COMMUNITY

## 2024-01-10 RX ORDER — ASCORBIC ACID 100 MG
1 TABLET TABLET,CHEWABLE ORAL ONCE A DAY
Status: ACTIVE | COMMUNITY

## 2024-01-10 RX ORDER — CELECOXIB 200 MG/1
1 CAPSULE WITH FOOD CAPSULE ORAL ONCE A DAY
Status: ACTIVE | COMMUNITY

## 2024-01-10 RX ORDER — METFORMIN HYDROCHLORIDE 500 MG/1
1 TABLET WITH A MEAL TABLET, FILM COATED ORAL ONCE A DAY
Status: ACTIVE | COMMUNITY

## 2024-01-10 NOTE — HPI-TODAY'S VISIT:
07/29/2020 Patient is a 59 year old, White male, who presents with complaints of explosive, loose bowel movements. He has been on Metformin for about a year now. He had a colonoscopy in the past, many years ago. He reports abdominal pain, only when he is about to have a bowel movement. He denies any rectal bleeding. He does report constipation in the morning. Patient is also taking testosterone.  9/28/2022 Patient presents for GI issues. Patient states that he has constant diarrhea. Patient states that the diarrhea has been the same since the last visit. He states that he has about 3-4 bowels movements a day with diarrhea. Patient states that he believes that he was given questran in 2020 but that he does not remember if it was helpful. Patient takes metformin for diabetes. He takes testosterone as well. Patient states that he thinks that anything causes him the diarrhea. He denies being more stressed lately and getting upset easily. Patient denies family history of colon caner or polyps. He states that he takes ibuprofen occasionally. Patient still has his gall bladder.  10/26/2022 Patient presents for a follow up on medications. Patient denies any more loose bowels. He states that he is still on hyoscyamine and questran powder for his diarrhea. He does state that he is getting a bit constipated now and wants to cut back on the questran powder. Patient also states that he has gas. He states that overall, things have been a lot better since the last visit. Patient states that he stopped the metformin.  1/18/2023 Patient presents for a follow up. Patient states that he is having has and that he will have loose bowels if he does not take the questran powder. He is currently taking one pack of powder a day. He states that he is having some constipation. Patient states that he has bloating sometimes and states that he seems to get gas with any foods. Patient states that he is on IBgard and that he has not seen much difference. Patient states that overall he is feeling a lot better. He states that he is on hyoscyamine twice a day. He denies heartburn and acid reflux.  7/12/2023 Patient is here for a follow up visit. Patient states that he continues to experience diarrhea. He states that his wife has come back home which has improved his diet quality. Patient states that his diet quality may decrease once she leaves soon. Patient states that he is currently taking IBgard. Patient has also been taking cholestyramine and hyoscyamine.  9/13/2023 Patient presents for a follow up. Patient denies having a lot of loose bowels and states that he only has a couple of loose bowels. Patient states that he has noticed certain foods that trigger his symptoms. He is currently on dicyclomine and questran powder. Patient is also still on IBgard.  1/10/2024 Patient presents for a follow up. Patient states that he has some diarrhea at times and states that the questra powder has been helping. He states that he is still on dicyclomine twice a day. Patient denies abdominal pain and states that most of his symptoms are under control. He also states that sometimes he has a change in bowel habits. The only thing that has not gotten better has been his excessive gas and states that he has tried align in the past.

## 2024-04-10 ENCOUNTER — OV EP (OUTPATIENT)
Dept: URBAN - METROPOLITAN AREA CLINIC 82 | Facility: CLINIC | Age: 64
End: 2024-04-10
Payer: COMMERCIAL

## 2024-04-10 VITALS
DIASTOLIC BLOOD PRESSURE: 80 MMHG | WEIGHT: 256 LBS | TEMPERATURE: 97.6 F | HEART RATE: 88 BPM | HEIGHT: 71 IN | SYSTOLIC BLOOD PRESSURE: 135 MMHG | BODY MASS INDEX: 35.84 KG/M2

## 2024-04-10 DIAGNOSIS — K58.0 IRRITABLE BOWEL SYNDROME WITH DIARRHEA: ICD-10-CM

## 2024-04-10 DIAGNOSIS — R14.0 BLOATING: ICD-10-CM

## 2024-04-10 DIAGNOSIS — R19.7 DIARRHEA: ICD-10-CM

## 2024-04-10 PROCEDURE — 99214 OFFICE O/P EST MOD 30 MIN: CPT | Performed by: INTERNAL MEDICINE

## 2024-04-10 RX ORDER — METFORMIN HYDROCHLORIDE 500 MG/1
1 TABLET WITH A MEAL TABLET, FILM COATED ORAL ONCE A DAY
Status: ACTIVE | COMMUNITY

## 2024-04-10 RX ORDER — CHOLESTYRAMINE 4 G/9G
1 PACKET MIXED WITH WATER OR NON-CARBONATED DRINK POWDER, FOR SUSPENSION ORAL TWICE A DAY
Qty: 180 | Refills: 1 | Status: ACTIVE | COMMUNITY
Start: 2023-09-13

## 2024-04-10 RX ORDER — CELECOXIB 200 MG/1
1 CAPSULE WITH FOOD CAPSULE ORAL ONCE A DAY
Status: ACTIVE | COMMUNITY

## 2024-04-10 RX ORDER — TAMSULOSIN HYDROCHLORIDE 0.4 MG/1
1 CAPSULE CAPSULE ORAL ONCE A DAY
Status: ACTIVE | COMMUNITY

## 2024-04-10 RX ORDER — ASCORBIC ACID 100 MG
1 TABLET TABLET,CHEWABLE ORAL ONCE A DAY
Status: ACTIVE | COMMUNITY

## 2024-04-10 RX ORDER — SILDENAFIL 100 MG/1
1 TABLET AS NEEDED TABLET, FILM COATED ORAL ONCE A DAY
Status: ACTIVE | COMMUNITY

## 2024-04-10 RX ORDER — DICYCLOMINE HYDROCHLORIDE 10 MG/1
1 TABLET CAPSULE ORAL THREE TIMES A DAY
Qty: 270 TABLET | Refills: 1 | OUTPATIENT
Start: 2024-04-10 | End: 2024-10-07

## 2024-04-10 RX ORDER — MULTIVITAMIN
AS DIRECTED CAPSULE ORAL
Status: ACTIVE | COMMUNITY

## 2024-04-10 RX ORDER — CHOLESTYRAMINE 4 G/9G
1 PACKET MIXED WITH WATER OR NON-CARBONATED DRINK POWDER, FOR SUSPENSION ORAL TWICE A DAY
Qty: 180 | Refills: 1 | Status: ACTIVE | COMMUNITY
Start: 2024-01-10

## 2024-04-10 RX ORDER — MONTELUKAST SODIUM 10 MG/1
1 TABLET TABLET ORAL ONCE A DAY
Status: ACTIVE | COMMUNITY

## 2024-04-10 RX ORDER — CHOLESTYRAMINE 4 G/9G
1 PACKET MIXED WITH WATER OR NON-CARBONATED DRINK POWDER, FOR SUSPENSION ORAL TWICE A DAY
Qty: 180 | Refills: 1 | Status: ACTIVE | COMMUNITY
Start: 2023-06-02

## 2024-04-10 RX ORDER — CHOLESTYRAMINE 4 G/9G
1 PACKET MIXED WITH WATER OR NON-CARBONATED DRINK POWDER, FOR SUSPENSION ORAL ONCE A DAY
Qty: 90 | Refills: 1 | OUTPATIENT
Start: 2024-04-10

## 2024-04-10 RX ORDER — DICYCLOMINE HYDROCHLORIDE 10 MG/1
1 TABLET CAPSULE ORAL THREE TIMES A DAY
Qty: 270 TABLET | Refills: 1 | Status: ACTIVE | COMMUNITY
Start: 2024-01-10 | End: 2024-07-08

## 2024-04-10 NOTE — HPI-TODAY'S VISIT:
07/29/2020 Patient is a 59 year old, White male, who presents with complaints of explosive, loose bowel movements. He has been on Metformin for about a year now. He had a colonoscopy in the past, many years ago. He reports abdominal pain, only when he is about to have a bowel movement. He denies any rectal bleeding. He does report constipation in the morning. Patient is also taking testosterone.  9/28/2022 Patient presents for GI issues. Patient states that he has constant diarrhea. Patient states that the diarrhea has been the same since the last visit. He states that he has about 3-4 bowels movements a day with diarrhea. Patient states that he believes that he was given questran in 2020 but that he does not remember if it was helpful. Patient takes metformin for diabetes. He takes testosterone as well. Patient states that he thinks that anything causes him the diarrhea. He denies being more stressed lately and getting upset easily. Patient denies family history of colon caner or polyps. He states that he takes ibuprofen occasionally. Patient still has his gall bladder.  10/26/2022 Patient presents for a follow up on medications. Patient denies any more loose bowels. He states that he is still on hyoscyamine and questran powder for his diarrhea. He does state that he is getting a bit constipated now and wants to cut back on the questran powder. Patient also states that he has gas. He states that overall, things have been a lot better since the last visit. Patient states that he stopped the metformin.  1/18/2023 Patient presents for a follow up. Patient states that he is having has and that he will have loose bowels if he does not take the questran powder. He is currently taking one pack of powder a day. He states that he is having some constipation. Patient states that he has bloating sometimes and states that he seems to get gas with any foods. Patient states that he is on IBgard and that he has not seen much difference. Patient states that overall he is feeling a lot better. He states that he is on hyoscyamine twice a day. He denies heartburn and acid reflux.  7/12/2023 Patient is here for a follow up visit. Patient states that he continues to experience diarrhea. He states that his wife has come back home which has improved his diet quality. Patient states that his diet quality may decrease once she leaves soon. Patient states that he is currently taking IBgard. Patient has also been taking cholestyramine and hyoscyamine.  9/13/2023 Patient presents for a follow up. Patient denies having a lot of loose bowels and states that he only has a couple of loose bowels. Patient states that he has noticed certain foods that trigger his symptoms. He is currently on dicyclomine and questran powder. Patient is also still on IBgard.  1/10/2024 Patient presents for a follow up. Patient states that he has some diarrhea at times and states that the questra powder has been helping. He states that he is still on dicyclomine twice a day. Patient denies abdominal pain and states that most of his symptoms are under control. He also states that sometimes he has a change in bowel habits. The only thing that has not gotten better has been his excessive gas and states that he has tried align in the past.  4/10/2024 Patient presents for a follow up. Patient is still on questran powder and dicyclomine bid. He states that the diarrhea has been very little and more under control. He denies any new symptoms.

## 2024-10-18 ENCOUNTER — OFFICE VISIT (OUTPATIENT)
Dept: URBAN - METROPOLITAN AREA CLINIC 82 | Facility: CLINIC | Age: 64
End: 2024-10-18
Payer: COMMERCIAL

## 2024-10-18 ENCOUNTER — DASHBOARD ENCOUNTERS (OUTPATIENT)
Age: 64
End: 2024-10-18

## 2024-10-18 VITALS
TEMPERATURE: 98.1 F | SYSTOLIC BLOOD PRESSURE: 122 MMHG | WEIGHT: 247 LBS | HEART RATE: 82 BPM | DIASTOLIC BLOOD PRESSURE: 74 MMHG | BODY MASS INDEX: 34.58 KG/M2 | HEIGHT: 71 IN

## 2024-10-18 DIAGNOSIS — R14.0 BLOATING: ICD-10-CM

## 2024-10-18 DIAGNOSIS — R19.7 DIARRHEA: ICD-10-CM

## 2024-10-18 DIAGNOSIS — K58.0 IRRITABLE BOWEL SYNDROME WITH DIARRHEA: ICD-10-CM

## 2024-10-18 PROCEDURE — 99214 OFFICE O/P EST MOD 30 MIN: CPT | Performed by: INTERNAL MEDICINE

## 2024-10-18 RX ORDER — ASCORBIC ACID 100 MG
1 TABLET TABLET,CHEWABLE ORAL ONCE A DAY
Status: ACTIVE | COMMUNITY

## 2024-10-18 RX ORDER — METFORMIN HYDROCHLORIDE 500 MG/1
1 TABLET WITH A MEAL TABLET, FILM COATED ORAL ONCE A DAY
Status: ACTIVE | COMMUNITY

## 2024-10-18 RX ORDER — CHOLESTYRAMINE 4 G/9G
1 PACKET MIXED WITH WATER OR NON-CARBONATED DRINK POWDER, FOR SUSPENSION ORAL ONCE A DAY
Qty: 90 | Refills: 1 | OUTPATIENT
Start: 2024-10-18

## 2024-10-18 RX ORDER — CHOLESTYRAMINE 4 G/9G
1 PACKET MIXED WITH WATER OR NON-CARBONATED DRINK POWDER, FOR SUSPENSION ORAL TWICE A DAY
Qty: 180 | Refills: 1 | Status: ACTIVE | COMMUNITY
Start: 2023-06-02

## 2024-10-18 RX ORDER — MULTIVITAMIN
AS DIRECTED CAPSULE ORAL
Status: ACTIVE | COMMUNITY

## 2024-10-18 RX ORDER — TAMSULOSIN HYDROCHLORIDE 0.4 MG/1
1 CAPSULE CAPSULE ORAL ONCE A DAY
Status: ACTIVE | COMMUNITY

## 2024-10-18 RX ORDER — SILDENAFIL 100 MG/1
1 TABLET AS NEEDED TABLET, FILM COATED ORAL ONCE A DAY
Status: ACTIVE | COMMUNITY

## 2024-10-18 RX ORDER — MONTELUKAST SODIUM 10 MG/1
1 TABLET TABLET ORAL ONCE A DAY
Status: ACTIVE | COMMUNITY

## 2024-10-18 RX ORDER — DICYCLOMINE HYDROCHLORIDE 10 MG/1
1 TABLET CAPSULE ORAL THREE TIMES A DAY
Qty: 270 TABLET | Refills: 1 | OUTPATIENT
Start: 2024-10-18 | End: 2025-04-16

## 2024-10-18 RX ORDER — CELECOXIB 200 MG/1
1 CAPSULE WITH FOOD CAPSULE ORAL ONCE A DAY
Status: ACTIVE | COMMUNITY

## 2024-10-18 NOTE — HPI-TODAY'S VISIT:
07/29/2020 Patient is a 59 year old, White male, who presents with complaints of explosive, loose bowel movements. He has been on Metformin for about a year now. He had a colonoscopy in the past, many years ago. He reports abdominal pain, only when he is about to have a bowel movement. He denies any rectal bleeding. He does report constipation in the morning. Patient is also taking testosterone.  9/28/2022 Patient presents for GI issues. Patient states that he has constant diarrhea. Patient states that the diarrhea has been the same since the last visit. He states that he has about 3-4 bowels movements a day with diarrhea. Patient states that he believes that he was given questran in 2020 but that he does not remember if it was helpful. Patient takes metformin for diabetes. He takes testosterone as well. Patient states that he thinks that anything causes him the diarrhea. He denies being more stressed lately and getting upset easily. Patient denies family history of colon caner or polyps. He states that he takes ibuprofen occasionally. Patient still has his gall bladder.  10/26/2022 Patient presents for a follow up on medications. Patient denies any more loose bowels. He states that he is still on hyoscyamine and questran powder for his diarrhea. He does state that he is getting a bit constipated now and wants to cut back on the questran powder. Patient also states that he has gas. He states that overall, things have been a lot better since the last visit. Patient states that he stopped the metformin.  1/18/2023 Patient presents for a follow up. Patient states that he is having has and that he will have loose bowels if he does not take the questran powder. He is currently taking one pack of powder a day. He states that he is having some constipation. Patient states that he has bloating sometimes and states that he seems to get gas with any foods. Patient states that he is on IBgard and that he has not seen much difference. Patient states that overall he is feeling a lot better. He states that he is on hyoscyamine twice a day. He denies heartburn and acid reflux.  7/12/2023 Patient is here for a follow up visit. Patient states that he continues to experience diarrhea. He states that his wife has come back home which has improved his diet quality. Patient states that his diet quality may decrease once she leaves soon. Patient states that he is currently taking IBgard. Patient has also been taking cholestyramine and hyoscyamine.  9/13/2023 Patient presents for a follow up. Patient denies having a lot of loose bowels and states that he only has a couple of loose bowels. Patient states that he has noticed certain foods that trigger his symptoms. He is currently on dicyclomine and questran powder. Patient is also still on IBgard.  1/10/2024 Patient presents for a follow up. Patient states that he has some diarrhea at times and states that the questra powder has been helping. He states that he is still on dicyclomine twice a day. Patient denies abdominal pain and states that most of his symptoms are under control. He also states that sometimes he has a change in bowel habits. The only thing that has not gotten better has been his excessive gas and states that he has tried align in the past.  4/10/2024 Patient presents for a follow up. Patient is still on questran powder and dicyclomine bid. He states that the diarrhea has been very little and more under control. He denies any new symptoms.  10/18/2024 Patient presents for a follow up. Patient says that he has been having very little symptoms of loose bowels and says that he is not stressed. Patient has been taking dicyclomine bid and says it has been helping. He says that he has been able to decrease his use of Questran. Patiet says that he has been having alot of mucous lately. He says that he has been exercising.

## 2025-04-11 ENCOUNTER — OFFICE VISIT (OUTPATIENT)
Dept: URBAN - METROPOLITAN AREA CLINIC 82 | Facility: CLINIC | Age: 65
End: 2025-04-11

## 2025-04-11 RX ORDER — ASCORBIC ACID 100 MG
1 TABLET TABLET,CHEWABLE ORAL ONCE A DAY
Status: ACTIVE | COMMUNITY

## 2025-04-11 RX ORDER — CHOLESTYRAMINE 4 G/9G
1 PACKET MIXED WITH WATER OR NON-CARBONATED DRINK POWDER, FOR SUSPENSION ORAL TWICE A DAY
Qty: 180 | Refills: 1 | Status: ACTIVE | COMMUNITY
Start: 2023-06-02

## 2025-04-11 RX ORDER — MULTIVITAMIN
AS DIRECTED CAPSULE ORAL
Status: ACTIVE | COMMUNITY

## 2025-04-11 RX ORDER — TAMSULOSIN HYDROCHLORIDE 0.4 MG/1
1 CAPSULE CAPSULE ORAL ONCE A DAY
Status: ACTIVE | COMMUNITY

## 2025-04-11 RX ORDER — CELECOXIB 200 MG/1
1 CAPSULE WITH FOOD CAPSULE ORAL ONCE A DAY
Status: ACTIVE | COMMUNITY

## 2025-04-11 RX ORDER — MONTELUKAST SODIUM 10 MG/1
1 TABLET TABLET ORAL ONCE A DAY
Status: ACTIVE | COMMUNITY

## 2025-04-11 RX ORDER — CHOLESTYRAMINE 4 G/9G
1 PACKET MIXED WITH WATER OR NON-CARBONATED DRINK POWDER, FOR SUSPENSION ORAL ONCE A DAY
Qty: 90 | Refills: 1 | Status: ACTIVE | COMMUNITY
Start: 2024-10-18

## 2025-04-11 RX ORDER — SILDENAFIL 100 MG/1
1 TABLET AS NEEDED TABLET, FILM COATED ORAL ONCE A DAY
Status: ACTIVE | COMMUNITY

## 2025-04-11 RX ORDER — DICYCLOMINE HYDROCHLORIDE 10 MG/1
1 TABLET CAPSULE ORAL THREE TIMES A DAY
Qty: 270 TABLET | Refills: 1 | Status: ACTIVE | COMMUNITY
Start: 2024-10-18 | End: 2025-04-16

## 2025-04-11 RX ORDER — METFORMIN HYDROCHLORIDE 500 MG/1
1 TABLET WITH A MEAL TABLET, FILM COATED ORAL ONCE A DAY
Status: ACTIVE | COMMUNITY

## 2025-08-11 ENCOUNTER — P2P PATIENT RECORD (OUTPATIENT)
Age: 65
End: 2025-08-11